# Patient Record
Sex: FEMALE | Race: WHITE | NOT HISPANIC OR LATINO | URBAN - METROPOLITAN AREA
[De-identification: names, ages, dates, MRNs, and addresses within clinical notes are randomized per-mention and may not be internally consistent; named-entity substitution may affect disease eponyms.]

---

## 2018-04-02 ENCOUNTER — APPOINTMENT (RX ONLY)
Dept: URBAN - METROPOLITAN AREA CLINIC 154 | Facility: CLINIC | Age: 43
Setting detail: DERMATOLOGY
End: 2018-04-02

## 2018-04-02 DIAGNOSIS — L98.8 OTHER SPECIFIED DISORDERS OF THE SKIN AND SUBCUTANEOUS TISSUE: ICD-10-CM

## 2018-04-02 PROBLEM — D56.9 THALASSEMIA, UNSPECIFIED: Status: ACTIVE | Noted: 2018-04-02

## 2018-04-02 PROBLEM — L70.0 ACNE VULGARIS: Status: ACTIVE | Noted: 2018-04-02

## 2018-04-02 PROCEDURE — ? JUVEDERM ULTRA PLUS XC INJECTION

## 2018-04-02 PROCEDURE — ? ADDITIONAL NOTES

## 2018-04-02 ASSESSMENT — LOCATION SIMPLE DESCRIPTION DERM
LOCATION SIMPLE: LEFT CHEEK
LOCATION SIMPLE: RIGHT CHEEK
LOCATION SIMPLE: LEFT LIP
LOCATION SIMPLE: RIGHT LIP

## 2018-04-02 ASSESSMENT — LOCATION DETAILED DESCRIPTION DERM
LOCATION DETAILED: RIGHT CENTRAL MALAR CHEEK
LOCATION DETAILED: LEFT NASOLABIAL FOLD
LOCATION DETAILED: LEFT CENTRAL MALAR CHEEK
LOCATION DETAILED: RIGHT NASOLABIAL FOLD

## 2018-04-02 ASSESSMENT — LOCATION ZONE DERM
LOCATION ZONE: FACE
LOCATION ZONE: LIP

## 2018-04-02 NOTE — PROCEDURE: ADDITIONAL NOTES
Additional Notes: counseled pt that we only used 1 syringe today, so did NOT fill completely restore volume.  Recommend additional treatment when ready.\\n\\lissett reviewed botox and skinPen with pt

## 2018-05-02 ENCOUNTER — APPOINTMENT (RX ONLY)
Dept: URBAN - METROPOLITAN AREA CLINIC 154 | Facility: CLINIC | Age: 43
Setting detail: DERMATOLOGY
End: 2018-05-02

## 2018-05-02 DIAGNOSIS — Z41.9 ENCOUNTER FOR PROCEDURE FOR PURPOSES OTHER THAN REMEDYING HEALTH STATE, UNSPECIFIED: ICD-10-CM

## 2018-05-02 PROCEDURE — ? COSMETIC CONSULTATION: FILLERS

## 2018-05-02 PROCEDURE — ? PATIENT SPECIFIC COUNSELING

## 2018-05-02 ASSESSMENT — LOCATION DETAILED DESCRIPTION DERM
LOCATION DETAILED: LEFT MEDIAL INFERIOR EYELID
LOCATION DETAILED: RIGHT INFERIOR MEDIAL MALAR CHEEK
LOCATION DETAILED: RIGHT CENTRAL MALAR CHEEK
LOCATION DETAILED: RIGHT LATERAL INFERIOR EYELID
LOCATION DETAILED: RIGHT UPPER CUTANEOUS LIP
LOCATION DETAILED: LEFT CENTRAL MALAR CHEEK
LOCATION DETAILED: LEFT SUPERIOR MEDIAL BUCCAL CHEEK
LOCATION DETAILED: LEFT UPPER CUTANEOUS LIP

## 2018-05-02 ASSESSMENT — LOCATION ZONE DERM
LOCATION ZONE: FACE
LOCATION ZONE: LIP
LOCATION ZONE: EYELID

## 2018-05-02 ASSESSMENT — LOCATION SIMPLE DESCRIPTION DERM
LOCATION SIMPLE: LEFT CHEEK
LOCATION SIMPLE: RIGHT LIP
LOCATION SIMPLE: LEFT INFERIOR EYELID
LOCATION SIMPLE: RIGHT INFERIOR EYELID
LOCATION SIMPLE: RIGHT CHEEK
LOCATION SIMPLE: LEFT LIP

## 2018-05-02 NOTE — PROCEDURE: PATIENT SPECIFIC COUNSELING
1 month ago patient received 1 syringe of Juviderm ultra plus xc in bilateral cheeks and nasolabial folds. Please see associated note 4/2/18. Patient is not completely satisfied with the fact that only minimal improvement is seen under her eyes bilaterally after her treatment, and after spending the money and not seeing the results she wanted.  As last visit, she was counseled again today regarding documentation that a single syringe was used during treatment day; therefore, it did NOT fill completely to restore volume and recommended that additional treatment should be done when patient was ready.  She also would like Botox to her perioral region; however doesn’t want eversion of lip border too much due to her field of work. Wants a smooth vermillion border.  Advised her to reschedule an appointment for further filler +/- Botox treatment with Dr. Rivera or LISA Palmer in [facility] office if she wishes. Discussed importance of daily sunscreen, retinoids and vitamin c topically for daily, as skin tolerates, skin care regimen. \\nNo charge for patient today, due to inconvenience for patient, as she was expecting possibility of more filler treatment today; however, providers who do these treatments are not within this office today. 1 month ago patient received 1 syringe of Juviderm ultra plus xc in bilateral cheeks and nasolabial folds. Please see associated note 4/2/18. Patient is not completely satisfied with the fact that only minimal improvement is seen under her eyes bilaterally after her treatment, and after spending the money and not seeing the results she wanted.  As last visit, she was counseled again today regarding documentation that a single syringe was used during treatment day; therefore, it did NOT fill completely to restore volume and recommended that additional treatment should be done when patient was ready.  She also would like Botox to her perioral region; however doesn’t want eversion of lip border too much due to her field of work. Wants a smooth vermillion border.  Advised her to reschedule an appointment for further filler +/- Botox treatment with Dr. Rivera or LISA Palmer in [facility] office if she wishes. Discussed importance of daily sunscreen, retinoids and vitamin c topically for daily, as skin tolerates, skin care regimen. \\nNo charge for patient today, due to inconvenience for patient, as she was expecting possibility of more filler treatment today; however, providers who do these treatments are not within this office today.